# Patient Record
Sex: MALE | Race: WHITE | URBAN - METROPOLITAN AREA
[De-identification: names, ages, dates, MRNs, and addresses within clinical notes are randomized per-mention and may not be internally consistent; named-entity substitution may affect disease eponyms.]

---

## 2020-04-21 ENCOUNTER — NEW PATIENT (OUTPATIENT)
Dept: URBAN - METROPOLITAN AREA CLINIC 51 | Facility: CLINIC | Age: 70
End: 2020-04-21
Payer: MEDICARE

## 2020-04-21 DIAGNOSIS — H43.813 VITREOUS DEGENERATION, BILATERAL: Primary | ICD-10-CM

## 2020-04-21 DIAGNOSIS — R42 DIZZINESS AND GIDDINESS: ICD-10-CM

## 2020-04-21 DIAGNOSIS — H50.112 MONOCULAR EXOTROPIA, LEFT EYE: ICD-10-CM

## 2020-04-21 PROCEDURE — 92004 COMPRE OPH EXAM NEW PT 1/>: CPT | Performed by: OPTOMETRIST

## 2020-04-21 PROCEDURE — 92134 CPTRZ OPH DX IMG PST SGM RTA: CPT | Performed by: OPTOMETRIST

## 2020-04-21 ASSESSMENT — INTRAOCULAR PRESSURE
OS: 16
OD: 16

## 2021-05-25 ENCOUNTER — OFFICE VISIT (OUTPATIENT)
Dept: URBAN - METROPOLITAN AREA CLINIC 51 | Facility: CLINIC | Age: 71
End: 2021-05-25
Payer: MEDICARE

## 2021-05-25 DIAGNOSIS — H04.123 DRY EYE SYNDROME OF BILATERAL LACRIMAL GLANDS: ICD-10-CM

## 2021-05-25 DIAGNOSIS — H52.4 PRESBYOPIA: ICD-10-CM

## 2021-05-25 DIAGNOSIS — H25.13 AGE-RELATED NUCLEAR CATARACT, BILATERAL: ICD-10-CM

## 2021-05-25 PROCEDURE — 92014 COMPRE OPH EXAM EST PT 1/>: CPT | Performed by: OPTOMETRIST

## 2021-05-25 PROCEDURE — 92134 CPTRZ OPH DX IMG PST SGM RTA: CPT | Performed by: OPTOMETRIST

## 2021-05-25 ASSESSMENT — KERATOMETRY
OD: 45.01
OS: 45.19

## 2021-05-25 ASSESSMENT — VISUAL ACUITY
OD: 20/20
OS: 20/20

## 2021-05-25 ASSESSMENT — INTRAOCULAR PRESSURE
OS: 17
OD: 17

## 2021-05-25 NOTE — IMPRESSION/PLAN
Impression: Vitreous degeneration, bilateral: H43.813.  Plan: macular oct
 retina intact , no treatment necessary